# Patient Record
Sex: MALE | Race: WHITE | NOT HISPANIC OR LATINO | Employment: OTHER | URBAN - METROPOLITAN AREA
[De-identification: names, ages, dates, MRNs, and addresses within clinical notes are randomized per-mention and may not be internally consistent; named-entity substitution may affect disease eponyms.]

---

## 2018-09-09 ENCOUNTER — OFFICE VISIT (OUTPATIENT)
Dept: URGENT CARE | Facility: CLINIC | Age: 48
End: 2018-09-09
Payer: MEDICARE

## 2018-09-09 VITALS
HEART RATE: 82 BPM | SYSTOLIC BLOOD PRESSURE: 188 MMHG | DIASTOLIC BLOOD PRESSURE: 109 MMHG | HEIGHT: 64 IN | OXYGEN SATURATION: 98 % | WEIGHT: 170 LBS | BODY MASS INDEX: 29.02 KG/M2 | TEMPERATURE: 98.9 F | RESPIRATION RATE: 18 BRPM

## 2018-09-09 DIAGNOSIS — R03.0 ELEVATED BLOOD PRESSURE READING IN OFFICE WITHOUT DIAGNOSIS OF HYPERTENSION: ICD-10-CM

## 2018-09-09 DIAGNOSIS — R51.9 FACIAL PAIN, ACUTE: Primary | ICD-10-CM

## 2018-09-09 PROCEDURE — 99203 OFFICE O/P NEW LOW 30 MIN: CPT | Performed by: EMERGENCY MEDICINE

## 2018-09-09 PROCEDURE — G0463 HOSPITAL OUTPT CLINIC VISIT: HCPCS | Performed by: EMERGENCY MEDICINE

## 2018-09-09 RX ORDER — DIVALPROEX SODIUM 500 MG/1
500 TABLET, DELAYED RELEASE ORAL EVERY 8 HOURS SCHEDULED
COMMUNITY

## 2018-09-09 RX ORDER — ATORVASTATIN CALCIUM 10 MG/1
5 TABLET, FILM COATED ORAL DAILY
COMMUNITY

## 2018-09-09 RX ORDER — DULOXETIN HYDROCHLORIDE 30 MG/1
20 CAPSULE, DELAYED RELEASE ORAL DAILY
COMMUNITY

## 2018-09-09 RX ORDER — CLINDAMYCIN HYDROCHLORIDE 300 MG/1
300 CAPSULE ORAL 3 TIMES DAILY
Qty: 21 CAPSULE | Refills: 0 | Status: SHIPPED | OUTPATIENT
Start: 2018-09-09 | End: 2018-09-16

## 2018-09-09 NOTE — PROGRESS NOTES
[facility]        NAME: Aminata Monaco is a 46 y o  male  : 1970    MRN: [id number]  DATE: 2018  TIME: 1:27 PM    Assessment and Plan   Facial pain, acute [R51]  1  Facial pain, acute  clindamycin (CLEOCIN) 300 MG capsule   2  Elevated blood pressure reading in office without diagnosis of hypertension           Patient Instructions     Patient Instructions     Dental Abscess   AMBULATORY CARE:   A dental abscess  is a collection of pus in or around a tooth  A dental abscess is caused by bacteria  The bacteria usually enter the tooth when the enamel (outer part of the tooth) is damaged by tooth decay  Bacteria may also enter the tooth through a break or chip in the tooth, or a cut in the gum  Food particles that are stuck between the teeth for a long time may also lead to an abscess  Signs and symptoms of a dental abscess:   · Toothache, a loose tooth, or a tooth that is very sensitive to pressure or temperature    · Bad breath, unpleasant taste, and drooling    · Fever    · Pain, redness, and swelling of the gums, or swelling of your face and neck    · Pain when you open or close your mouth    · Trouble opening your mouth  Seek care immediately if:   · You have severe pain  · You have trouble breathing because of pain or swelling  Contact your healthcare provider if:   · Your symptoms get worse, even after treatment  · Your mouth is bleeding  · You cannot eat or drink because of pain or swelling  · Your abscess returns  · You have an injury that causes a crack in your tooth  · You have questions or concerns about your condition or care  Treatment:  You may  need any of the following:  · Medicines  may be given to treat a bacterial infection and decrease pain  · Incision and drainage  is a cut in the abscess to allow the pus to drain  A sample of fluid may be collected from your abscess  The fluid is sent to a lab and tested for bacteria   Ask your healthcare provider for more information  · A root canal  is a procedure to remove the bacteria and prevent more infection  It is usually done after an incision and drainage  A filling or crown will be placed over the tooth after you have healed from your root canal      · Tooth removal  may be needed if the infection affects deeper tissues  This is usually done after an incision and drainage  Self-care:   · Rinse your mouth every 2 hours with salt water  This will help keep the area clean  · Gently brush your teeth twice a day with a soft tooth brush  This will help keep the area clean  · Eat soft foods as directed  Soft foods may cause less pain  Examples include applesauce, yogurt, and cooked pasta  Ask your healthcare provider how long to follow this instruction  · Apply a warm compress to your tooth or gum  Use a cotton ball or gauze soaked in warm water  Remove the compress in 10 minutes or when it becomes cool  Repeat 3 times a day  Prevent another abscess:   · Brush your teeth at least 2 times a day with fluoride toothpaste  · Use dental floss to clean between your teeth at least once a day  · Rinse your mouth with water or mouthwash after meals and snacks  · Chew sugarless gum after meals and snacks  · Limit foods that are sticky and high in sugar such as raisons  Also limit drinks high in sugar, such as soda  · See your dentist every 6 months for dental cleanings and oral exams  Follow up with your healthcare provider in 24 hours: Your healthcare provider will need to check your teeth and gums  Write down your questions so you remember to ask them during your visits  © 2017 [address] [employer] Information is for End User's use only and may not be sold, redistributed or otherwise used for commercial purposes  All illustrations and images included in CareNotes® are the copyrighted property of [employer] M [employer]  or [employer]roz[employer]    The above information is an  only  It is not intended as medical advice for individual conditions or treatments  Talk to your doctor, nurse or pharmacist before following any medical regimen to see if it is safe and effective for you  Prehypertension   WHAT YOU NEED TO KNOW:   What is prehypertension? Prehypertension is a blood pressure level that is slightly higher than normal  Blood pressure is the force of your blood pressing against the walls of your arteries  Normal blood pressure is less than 120/80  Prehypertension is a blood pressure level of 120/80 to 139/89 in adults and adolescents  Hypertension (high blood pressure) is a blood pressure level of 140/90 or above  Prehypertension increases your risk for chronic (long-term) high blood pressure  Prehypertension and high blood pressure increase your risk for heart and blood vessel disease  Over time, this increases your risk for a life-threatening heart attack, stroke, heart failure, or kidney disease  What increases my risk for prehypertension? · Obesity or lack of exercise     · Eating too much sodium (salt)    · Low intake of fruits, vegetables, and other foods high in potassium    · Use of tobacco, alcohol, or illegal drugs     · A medical condition, such as diabetes, high cholesterol, or sleep apnea    · Medicines, such as steroids or birth control pills     · A family history of hypertension or heart disease     · Age older than 54 years (men)    · Age older than 72 years (women)  How is prehypertension diagnosed? Your healthcare provider will ask if you have a family history of high blood pressure  He will also ask about the medicines you take and any health conditions you have  He will check your blood pressure using a blood pressure cuff  Your healthcare provider may take more than one blood pressure reading, and take the average of these readings  How can I manage prehypertension? · Eat less sodium (salt)  Do not add sodium to your food   Limit foods that are high in sodium, such as canned foods, potato chips, and cold cuts  Your healthcare provider may suggest that you follow the [facility] [address]  This eating plan is low in sodium, unhealthy fats, and total fat  It is high in potassium, calcium, and fiber  · Exercise to maintain or reach a healthy weight  Exercise at least 30 minutes per day, on most days of the week  This will help decrease your blood pressure  Ask your healthcare provider about the best exercise plan for you  · Decrease stress  This may help lower your BP  Learn ways to relax, such as deep breathing or listening to music  · Limit alcohol  Ask your healthcare provider if it is safe for you to drink alcohol  Women should limit alcohol to 1 drink a day  Men should limit alcohol to 2 drinks a day  A drink of alcohol is 12 ounces of beer, 5 ounces of wine, or 1½ ounces of liquor  · Do not smoke  Nicotine and other chemicals in cigarettes and cigars can increase your blood pressure and cause lung damage  Ask your healthcare provider for information if you currently smoke and need help to quit  E-cigarettes or smokeless tobacco still contain nicotine  Talk to your healthcare provider before you use these products  · Blood pressure medicine  may be needed if you have diabetes, heart disease, or kidney disease  · Manage your other health conditions  Take your diabetes and cholesterol medicine as directed  Go to regular follow-up visits with your healthcare provider to manage these conditions    Call 911 for any of the following:   · You have any of the following signs of a heart attack:      ¨ Squeezing, pressure, or pain in your chest that lasts longer than 5 minutes or returns    ¨ Discomfort or pain in your back, neck, jaw, stomach, or arm     ¨ Trouble breathing    ¨ Nausea or vomiting    ¨ Lightheadedness or a sudden cold sweat, especially with chest pain or trouble breathing    · You have any of the following signs of a stroke:      ¨ Numbness or drooping on one side of your face     ¨ Weakness in an arm or leg    ¨ Confusion or difficulty speaking    ¨ Dizziness, a severe headache, or vision loss  When should I seek immediate care? · You have a severe headache  · You have sudden vision changes  When should I contact my healthcare provider? · You have been taking blood pressure medicine and your blood pressure is still higher than your healthcare provider says it should be  · You have questions or concerns about your condition or care  CARE AGREEMENT:   You have the right to help plan your care  Learn about your health condition and how it may be treated  Discuss treatment options with your caregivers to decide what care you want to receive  You always have the right to refuse treatment  The above information is an  only  It is not intended as medical advice for individual conditions or treatments  Talk to your doctor, nurse or pharmacist before following any medical regimen to see if it is safe and effective for you  © 2017 [address]  Information is for End User's use only and may not be sold, redistributed or otherwise used for commercial purposes  All illustrations and images included in CareNotes® are the copyrighted property of "[employer]" [employer]  or [employer]rozco  Follow up with PCP in 3-5 days  Proceed to  ER if symptoms worsen  Chief Complaint     Chief Complaint   Patient presents with    Dental Pain     right lower dental abscess for 2 weeks  pain and swelling         History of Present Illness       Patient complains of pain right lower molars for the past 2 weeks now with increasing pain redness and swelling of his right cheek for the past several days  He denies fever chills  Review of Systems   Review of Systems   Constitutional: Negative for chills and fever  HENT: Positive for dental problem and facial swelling      Respiratory: Negative for shortness of breath  Cardiovascular: Negative for chest pain  Skin: Positive for color change  Current Medications       Current Outpatient Prescriptions:     atorvastatin (LIPITOR) 10 mg tablet, Take 5 mg by mouth daily, Disp: , Rfl:     divalproex sodium (DEPAKOTE) 500 mg EC tablet, Take 500 mg by mouth every 8 (eight) hours, Disp: , Rfl:     DULoxetine (CYMBALTA) 30 mg delayed release capsule, Take 20 mg by mouth daily, Disp: , Rfl:     clindamycin (CLEOCIN) 300 MG capsule, Take 1 capsule (300 mg total) by mouth 3 (three) times a day for 7 days, Disp: 21 capsule, Rfl: 0    Current Allergies     Allergies as of 09/09/2018 - Reviewed 09/09/2018   Allergen Reaction Noted    Bactrim [sulfamethoxazole-trimethoprim] Swelling 09/09/2018    Percocet [oxycodone-acetaminophen] Rash 09/09/2018            The following portions of the patient's history were reviewed and updated as appropriate: allergies, current medications, past family history, past medical history, past social history, past surgical history and problem list      Past Medical History:   Diagnosis Date    Anxiety     Arthritis     Cancer ([facility] [address] )     High cholesterol        Past Surgical History:   Procedure Laterality Date    BACK SURGERY      HERNIA REPAIR      JOINT REPLACEMENT      PROSTATE SURGERY         No family history on file  Medications have been verified  Objective   BP (!) 188/109   Pulse 82   Temp 98 9 °F (37 2 °C) (Tympanic)   Resp 18   Ht 5' 4" (1 626 m)   Wt 77 1 kg (170 lb)   SpO2 98%   BMI 29 18 kg/m²        Physical Exam     Physical Exam   Constitutional: He is oriented to person, place, and time  He appears well-developed and well-nourished  No distress  HENT:   Mouth/Throat: No oral lesions  No trismus in the jaw  Abnormal dentition  Dental abscesses and dental caries present  No uvula swelling  No oropharyngeal exudate  Neck: Neck supple     Cardiovascular: Normal rate and regular rhythm  Pulmonary/Chest: Effort normal and breath sounds normal    Musculoskeletal: He exhibits no tenderness  Neurological: He is alert and oriented to person, place, and time  Skin: Skin is warm and dry  No rash noted  There is erythema  Nursing note and vitals reviewed

## 2018-09-09 NOTE — PATIENT INSTRUCTIONS
Dental Abscess   AMBULATORY CARE:   A dental abscess  is a collection of pus in or around a tooth  A dental abscess is caused by bacteria  The bacteria usually enter the tooth when the enamel (outer part of the tooth) is damaged by tooth decay  Bacteria may also enter the tooth through a break or chip in the tooth, or a cut in the gum  Food particles that are stuck between the teeth for a long time may also lead to an abscess  Signs and symptoms of a dental abscess:   · Toothache, a loose tooth, or a tooth that is very sensitive to pressure or temperature    · Bad breath, unpleasant taste, and drooling    · Fever    · Pain, redness, and swelling of the gums, or swelling of your face and neck    · Pain when you open or close your mouth    · Trouble opening your mouth  Seek care immediately if:   · You have severe pain  · You have trouble breathing because of pain or swelling  Contact your healthcare provider if:   · Your symptoms get worse, even after treatment  · Your mouth is bleeding  · You cannot eat or drink because of pain or swelling  · Your abscess returns  · You have an injury that causes a crack in your tooth  · You have questions or concerns about your condition or care  Treatment:  You may  need any of the following:  · Medicines  may be given to treat a bacterial infection and decrease pain  · Incision and drainage  is a cut in the abscess to allow the pus to drain  A sample of fluid may be collected from your abscess  The fluid is sent to a lab and tested for bacteria  Ask your healthcare provider for more information  · A root canal  is a procedure to remove the bacteria and prevent more infection  It is usually done after an incision and drainage  A filling or crown will be placed over the tooth after you have healed from your root canal      · Tooth removal  may be needed if the infection affects deeper tissues   This is usually done after an incision and drainage  Self-care:   · Rinse your mouth every 2 hours with salt water  This will help keep the area clean  · Gently brush your teeth twice a day with a soft tooth brush  This will help keep the area clean  · Eat soft foods as directed  Soft foods may cause less pain  Examples include applesauce, yogurt, and cooked pasta  Ask your healthcare provider how long to follow this instruction  · Apply a warm compress to your tooth or gum  Use a cotton ball or gauze soaked in warm water  Remove the compress in 10 minutes or when it becomes cool  Repeat 3 times a day  Prevent another abscess:   · Brush your teeth at least 2 times a day with fluoride toothpaste  · Use dental floss to clean between your teeth at least once a day  · Rinse your mouth with water or mouthwash after meals and snacks  · Chew sugarless gum after meals and snacks  · Limit foods that are sticky and high in sugar such as raisons  Also limit drinks high in sugar, such as soda  · See your dentist every 6 months for dental cleanings and oral exams  Follow up with your healthcare provider in 24 hours: Your healthcare provider will need to check your teeth and gums  Write down your questions so you remember to ask them during your visits  © 2017 [address]  Information is for End User's use only and may not be sold, redistributed or otherwise used for commercial purposes  All illustrations and images included in CareNotes® are the copyrighted property of [employer]  or Guille Orozco  The above information is an  only  It is not intended as medical advice for individual conditions or treatments  Talk to your doctor, nurse or pharmacist before following any medical regimen to see if it is safe and effective for you  Prehypertension   WHAT YOU NEED TO KNOW:   What is prehypertension?   Prehypertension is a blood pressure level that is slightly higher than normal  Blood pressure is the force of your blood pressing against the walls of your arteries  Normal blood pressure is less than 120/80  Prehypertension is a blood pressure level of 120/80 to 139/89 in adults and adolescents  Hypertension (high blood pressure) is a blood pressure level of 140/90 or above  Prehypertension increases your risk for chronic (long-term) high blood pressure  Prehypertension and high blood pressure increase your risk for heart and blood vessel disease  Over time, this increases your risk for a life-threatening heart attack, stroke, heart failure, or kidney disease  What increases my risk for prehypertension? · Obesity or lack of exercise     · Eating too much sodium (salt)    · Low intake of fruits, vegetables, and other foods high in potassium    · Use of tobacco, alcohol, or illegal drugs     · A medical condition, such as diabetes, high cholesterol, or sleep apnea    · Medicines, such as steroids or birth control pills     · A family history of hypertension or heart disease     · Age older than 54 years (men)    · Age older than 72 years (women)  How is prehypertension diagnosed? Your healthcare provider will ask if you have a family history of high blood pressure  He will also ask about the medicines you take and any health conditions you have  He will check your blood pressure using a blood pressure cuff  Your healthcare provider may take more than one blood pressure reading, and take the average of these readings  How can I manage prehypertension? · Eat less sodium (salt)  Do not add sodium to your food  Limit foods that are high in sodium, such as canned foods, potato chips, and cold cuts  Your healthcare provider may suggest that you follow the [address] [place] [address] Street  This eating plan is low in sodium, unhealthy fats, and total fat  It is high in potassium, calcium, and fiber  · Exercise to maintain or reach a healthy weight  Exercise at least 30 minutes per day, on most days of the week   This will help decrease your blood pressure  Ask your healthcare provider about the best exercise plan for you  · Decrease stress  This may help lower your BP  Learn ways to relax, such as deep breathing or listening to music  · Limit alcohol  Ask your healthcare provider if it is safe for you to drink alcohol  Women should limit alcohol to 1 drink a day  Men should limit alcohol to 2 drinks a day  A drink of alcohol is 12 ounces of beer, 5 ounces of wine, or 1½ ounces of liquor  · Do not smoke  Nicotine and other chemicals in cigarettes and cigars can increase your blood pressure and cause lung damage  Ask your healthcare provider for information if you currently smoke and need help to quit  E-cigarettes or smokeless tobacco still contain nicotine  Talk to your healthcare provider before you use these products  · Blood pressure medicine  may be needed if you have diabetes, heart disease, or kidney disease  · Manage your other health conditions  Take your diabetes and cholesterol medicine as directed  Go to regular follow-up visits with your healthcare provider to manage these conditions  Call 911 for any of the following:   · You have any of the following signs of a heart attack:      ¨ Squeezing, pressure, or pain in your chest that lasts longer than 5 minutes or returns    ¨ Discomfort or pain in your back, neck, jaw, stomach, or arm     ¨ Trouble breathing    ¨ Nausea or vomiting    ¨ Lightheadedness or a sudden cold sweat, especially with chest pain or trouble breathing    · You have any of the following signs of a stroke:      ¨ Numbness or drooping on one side of your face     ¨ Weakness in an arm or leg    ¨ Confusion or difficulty speaking    ¨ Dizziness, a severe headache, or vision loss  When should I seek immediate care? · You have a severe headache  · You have sudden vision changes  When should I contact my healthcare provider?    · You have been taking blood pressure medicine and your blood pressure is still higher than your healthcare provider says it should be  · You have questions or concerns about your condition or care  CARE AGREEMENT:   You have the right to help plan your care  Learn about your health condition and how it may be treated  Discuss treatment options with your caregivers to decide what care you want to receive  You always have the right to refuse treatment  The above information is an  only  It is not intended as medical advice for individual conditions or treatments  Talk to your doctor, nurse or pharmacist before following any medical regimen to see if it is safe and effective for you  © 2017 [address] Information is for End User's use only and may not be sold, redistributed or otherwise used for commercial purposes  All illustrations and images included in CareNotes® are the copyrighted property of [employer] M [employer]  or Guille Orozco

## 2023-09-27 ENCOUNTER — NURSE TRIAGE (OUTPATIENT)
Age: 53
End: 2023-09-27

## 2023-09-27 NOTE — TELEPHONE ENCOUNTER
Please disregard my note below. Have pt be scheduled with Dr. Iron Ayala tomorrow. \    Left message for pt to call office back to schedule appt.

## 2023-09-27 NOTE — TELEPHONE ENCOUNTER
Left message for pt to call office back. Please scehduel next available appt. Pt can call PCP office, go to UC, or ER until appt.

## 2023-09-27 NOTE — TELEPHONE ENCOUNTER
Patient was advised to contact cardiologist.     She stated that her BG was 109 yesterday fasting, she is using Ozempic every Sunday. Regarding: swelling testicle  ----- Message from Karli Guzmán MA sent at 9/27/2023 10:42 AM EDT -----  New Patient    What is the reason for the patient's appointment?:  Pt'bud sylvester called to schedule an appt for swelling testicles and sore started a week ago.      What office location does the patient prefer?: [facility]    Does patient have Imaging/Lab Results:    Have patient records been requested?:  If No, are the records showing in Epic:       INSURANCE:   Do we accept the patient's insurance or is the patient Self-Pay?:    Insurance Provider: Medicare   Plan Type/Number:   Member ID#:       HISTORY:   Has the patient had any previous Urologist(s)?:  yes long time ago     Was the patient seen in the ED?: No    Has the patient had any outside testing done?: yes     Does the patient have a personal history of cancer?: prostate (removed)